# Patient Record
Sex: MALE | Race: BLACK OR AFRICAN AMERICAN | NOT HISPANIC OR LATINO | URBAN - METROPOLITAN AREA
[De-identification: names, ages, dates, MRNs, and addresses within clinical notes are randomized per-mention and may not be internally consistent; named-entity substitution may affect disease eponyms.]

---

## 2021-06-26 ENCOUNTER — EMERGENCY (EMERGENCY)
Facility: HOSPITAL | Age: 71
LOS: 1 days | Discharge: HOME | End: 2021-06-26
Attending: EMERGENCY MEDICINE | Admitting: EMERGENCY MEDICINE
Payer: MEDICARE

## 2021-06-26 VITALS
SYSTOLIC BLOOD PRESSURE: 156 MMHG | TEMPERATURE: 98 F | RESPIRATION RATE: 20 BRPM | HEIGHT: 66 IN | OXYGEN SATURATION: 94 % | DIASTOLIC BLOOD PRESSURE: 86 MMHG | WEIGHT: 186.95 LBS | HEART RATE: 92 BPM

## 2021-06-26 VITALS — DIASTOLIC BLOOD PRESSURE: 81 MMHG | TEMPERATURE: 98 F | SYSTOLIC BLOOD PRESSURE: 131 MMHG

## 2021-06-26 DIAGNOSIS — Z98.89 OTHER SPECIFIED POSTPROCEDURAL STATES: Chronic | ICD-10-CM

## 2021-06-26 LAB
ALBUMIN SERPL ELPH-MCNC: 4.2 G/DL — SIGNIFICANT CHANGE UP (ref 3.3–5)
ALP SERPL-CCNC: 94 U/L — SIGNIFICANT CHANGE UP (ref 40–120)
ALT FLD-CCNC: 55 U/L — SIGNIFICANT CHANGE UP (ref 12–78)
ANION GAP SERPL CALC-SCNC: 5 MMOL/L — SIGNIFICANT CHANGE UP (ref 5–17)
AST SERPL-CCNC: 36 U/L — SIGNIFICANT CHANGE UP (ref 15–37)
BASOPHILS # BLD AUTO: 0.08 K/UL — SIGNIFICANT CHANGE UP (ref 0–0.2)
BASOPHILS NFR BLD AUTO: 1.2 % — SIGNIFICANT CHANGE UP (ref 0–2)
BILIRUB SERPL-MCNC: 0.6 MG/DL — SIGNIFICANT CHANGE UP (ref 0.2–1.2)
BUN SERPL-MCNC: 11 MG/DL — SIGNIFICANT CHANGE UP (ref 7–23)
CALCIUM SERPL-MCNC: 9 MG/DL — SIGNIFICANT CHANGE UP (ref 8.5–10.1)
CHLORIDE SERPL-SCNC: 98 MMOL/L — SIGNIFICANT CHANGE UP (ref 96–108)
CO2 SERPL-SCNC: 32 MMOL/L — HIGH (ref 22–31)
CREAT SERPL-MCNC: 0.87 MG/DL — SIGNIFICANT CHANGE UP (ref 0.5–1.3)
D DIMER BLD IA.RAPID-MCNC: <150 NG/ML DDU — SIGNIFICANT CHANGE UP
EOSINOPHIL # BLD AUTO: 0.64 K/UL — HIGH (ref 0–0.5)
EOSINOPHIL NFR BLD AUTO: 9.9 % — HIGH (ref 0–6)
GLUCOSE SERPL-MCNC: 153 MG/DL — HIGH (ref 70–99)
HCT VFR BLD CALC: 40 % — SIGNIFICANT CHANGE UP (ref 39–50)
HGB BLD-MCNC: 13.1 G/DL — SIGNIFICANT CHANGE UP (ref 13–17)
IMM GRANULOCYTES NFR BLD AUTO: 0.6 % — SIGNIFICANT CHANGE UP (ref 0–1.5)
LYMPHOCYTES # BLD AUTO: 1.91 K/UL — SIGNIFICANT CHANGE UP (ref 1–3.3)
LYMPHOCYTES # BLD AUTO: 29.5 % — SIGNIFICANT CHANGE UP (ref 13–44)
MCHC RBC-ENTMCNC: 26.7 PG — LOW (ref 27–34)
MCHC RBC-ENTMCNC: 32.8 GM/DL — SIGNIFICANT CHANGE UP (ref 32–36)
MCV RBC AUTO: 81.6 FL — SIGNIFICANT CHANGE UP (ref 80–100)
MONOCYTES # BLD AUTO: 0.68 K/UL — SIGNIFICANT CHANGE UP (ref 0–0.9)
MONOCYTES NFR BLD AUTO: 10.5 % — SIGNIFICANT CHANGE UP (ref 2–14)
NEUTROPHILS # BLD AUTO: 3.12 K/UL — SIGNIFICANT CHANGE UP (ref 1.8–7.4)
NEUTROPHILS NFR BLD AUTO: 48.3 % — SIGNIFICANT CHANGE UP (ref 43–77)
NRBC # BLD: 0 /100 WBCS — SIGNIFICANT CHANGE UP (ref 0–0)
NT-PROBNP SERPL-SCNC: 23 PG/ML — SIGNIFICANT CHANGE UP (ref 0–125)
PLATELET # BLD AUTO: 263 K/UL — SIGNIFICANT CHANGE UP (ref 150–400)
POTASSIUM SERPL-MCNC: 3.8 MMOL/L — SIGNIFICANT CHANGE UP (ref 3.5–5.3)
POTASSIUM SERPL-SCNC: 3.8 MMOL/L — SIGNIFICANT CHANGE UP (ref 3.5–5.3)
PROT SERPL-MCNC: 8.1 G/DL — SIGNIFICANT CHANGE UP (ref 6–8.3)
RBC # BLD: 4.9 M/UL — SIGNIFICANT CHANGE UP (ref 4.2–5.8)
RBC # FLD: 15.2 % — HIGH (ref 10.3–14.5)
SODIUM SERPL-SCNC: 135 MMOL/L — SIGNIFICANT CHANGE UP (ref 135–145)
TROPONIN I SERPL-MCNC: <.015 NG/ML — SIGNIFICANT CHANGE UP (ref 0.01–0.04)
WBC # BLD: 6.47 K/UL — SIGNIFICANT CHANGE UP (ref 3.8–10.5)
WBC # FLD AUTO: 6.47 K/UL — SIGNIFICANT CHANGE UP (ref 3.8–10.5)

## 2021-06-26 PROCEDURE — 71045 X-RAY EXAM CHEST 1 VIEW: CPT

## 2021-06-26 PROCEDURE — 94640 AIRWAY INHALATION TREATMENT: CPT

## 2021-06-26 PROCEDURE — 85025 COMPLETE CBC W/AUTO DIFF WBC: CPT

## 2021-06-26 PROCEDURE — 83880 ASSAY OF NATRIURETIC PEPTIDE: CPT

## 2021-06-26 PROCEDURE — 80053 COMPREHEN METABOLIC PANEL: CPT

## 2021-06-26 PROCEDURE — 71045 X-RAY EXAM CHEST 1 VIEW: CPT | Mod: 26

## 2021-06-26 PROCEDURE — 93005 ELECTROCARDIOGRAM TRACING: CPT

## 2021-06-26 PROCEDURE — 36415 COLL VENOUS BLD VENIPUNCTURE: CPT

## 2021-06-26 PROCEDURE — 99284 EMERGENCY DEPT VISIT MOD MDM: CPT | Mod: 25

## 2021-06-26 PROCEDURE — 93010 ELECTROCARDIOGRAM REPORT: CPT

## 2021-06-26 PROCEDURE — 84484 ASSAY OF TROPONIN QUANT: CPT

## 2021-06-26 PROCEDURE — 85379 FIBRIN DEGRADATION QUANT: CPT

## 2021-06-26 PROCEDURE — 99284 EMERGENCY DEPT VISIT MOD MDM: CPT

## 2021-06-26 RX ORDER — IPRATROPIUM BROMIDE 0.2 MG/ML
500 SOLUTION, NON-ORAL INHALATION ONCE
Refills: 0 | Status: COMPLETED | OUTPATIENT
Start: 2021-06-26 | End: 2021-06-26

## 2021-06-26 RX ORDER — ALBUTEROL 90 UG/1
2.5 AEROSOL, METERED ORAL
Refills: 0 | Status: DISCONTINUED | OUTPATIENT
Start: 2021-06-26 | End: 2021-06-29

## 2021-06-26 RX ADMIN — ALBUTEROL 2.5 MILLIGRAM(S): 90 AEROSOL, METERED ORAL at 11:51

## 2021-06-26 RX ADMIN — Medication 125 MILLIGRAM(S): at 11:24

## 2021-06-26 RX ADMIN — ALBUTEROL 2.5 MILLIGRAM(S): 90 AEROSOL, METERED ORAL at 11:30

## 2021-06-26 RX ADMIN — Medication 500 MICROGRAM(S): at 12:30

## 2021-06-26 NOTE — ED ADULT NURSE NOTE - OBJECTIVE STATEMENT
Received pt in bed alert and oriented x4.  C/O sob since today.  Pt reports he is visiting from [place] and they have a dog which exerbated his allergies.  Pt went to  who sent patient for treatment.  Pt denies chest pain or n/v/d. EKG completed.  Pt on monitor.

## 2021-06-26 NOTE — ED PROVIDER NOTE - CONSTITUTIONAL, MLM
Well appearing, black female, awake, alert, oriented to person, place, time/situation and in mild apparent distress. normal...

## 2021-06-26 NOTE — ED ADULT NURSE NOTE - PMH
COPD (chronic obstructive pulmonary disease)    Diabetes mellitus  Type II  HTN (hypertension)    Lumbar disc disease    Nasal polyp    Sleep apnea

## 2021-06-26 NOTE — ED PROVIDER NOTE - NSFOLLOWUPINSTRUCTIONS_ED_ALL_ED_FT
Rest    Increase fluid intake    Use Your Albuterol Inhaler every 4-6 hours until 1-2 days after your shortness of breath and or cough resolves     Take MEDROL DOSE PACK as directed    Follow-up with your doctor this week     Return here if needed

## 2021-06-26 NOTE — ED PROVIDER NOTE - CARE PROVIDER_API CALL
Kris Valenzuela)  Critical Care Medicine; Internal Medicine; Pulmonary Disease  [address] [place] [address]  Phone: [phone]  Fax: [phone]  Follow Up Time:

## 2021-06-26 NOTE — ED ADULT TRIAGE NOTE - CHIEF COMPLAINT QUOTE
Patient is a 71yo Male complaining of shortness of breath and coughing since Monday Patient states that he thinks that his granddaughter dog is triggering his allergies Patient states he has white sputum Patient denies fever chest pain nausea vomiting diarrhea

## 2021-06-26 NOTE — ED PROVIDER NOTE - OBJECTIVE STATEMENT
69 yo black male with H/O COPD (chronic obstructive pulmonary disease)    Diabetes mellitus  Type II  HTN (hypertension)    Lumbar disc disease and    Sleep apnea who was at his baseline state of health up until the past few days when he began to experience mild but increasing SOB, wheezing and mild white productive sputum. Denies any chest pain, fever, chills, hemoptysis, nausea, vomiting or diarrhea. States that the SOB is only minimally worsened with exertion and chest pain can be present even when at rest. Was seen at [facility] today and ultimately sent here for further evaluation and management.

## 2021-06-26 NOTE — ED PROVIDER NOTE - PATIENT PORTAL LINK FT
You can access the FollowMyHealth Patient Portal offered by [facility] by registering at the following website: [email or web address]. By joining [facility]’s FollowMyHealth portal, you will also be able to view your health information using other applications (apps) compatible with our system.

## 2023-07-30 NOTE — ED ADULT NURSE NOTE - CAS DISCH CONDITION
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  Patient reports ambulating short distances to wheelchair at baseline.  Patient / Family Goal: return to previous functional status    Pain   Patient reports pain is not an issue/concern.     OBJECTIVE     Cognitive Status   Level of Consciousness   - alert  Arousal Alertness   - appropriate responses to stimuli  Affect/Behavior    - pleasant and cooperative  Orientation    - Oriented to: person, place, time and situation  Functional Communication   - Overall Status: within functional limits   - Forms of Communication: verbal  Attention Span    - Attention: appears intact   - Attention impairment: distractibility and impulsivity  Following Direction   - follows one step commands with increased time  Safety Awareness/Insight   - intact    Patient Activity Tolerance: 1 to 2 activity to rest         Bed Mobility  - Repositioning in bed: set up  - Supine to sit: total assist - dependent , 2 persons  - Sit to supine: total assist - dependent , 2 persons  CGA to min A x 2 for bed mobility due to impulsivity as patient uses momentum to complete bed mobility and nearly hit his head on the bedrail several times. Assist for setup in trendelenburg but patient able to boost self.   Transfers  Assistive devices: 2-wheeled walker  - Sit to stand: 2 persons, total assist - dependent   - Stand to sit: 2 persons, total assist - dependent   Three person assist due to assist needed force production, blocking of bilateral feet, stabilization of walker, and for safety. Patient with difficulty shifting weight anteriorly. Completed 3 sit<>stands this date (one from edge of bed, two from walker)    Ambulation / Gait  - Assistive device: gait  belt and two-wheeled walker  - Distance (feet unless otherwise indicated): 7, 20  - Assist Level: total assist - dependent  and 2 persons  Patient requesting to ambulate to wheelchair (despite pivot to wheelchair). Patient impulsive with amb, cues for decreased step length and for step to pattern, but patient unable to follow commands consistently. Gait improved with straight distance ambulation on second trial compared to ambulation in room to wheelchair. Three person assist throughout for steadying, close wheelchair follow, and cues for sequencing.      Wheelchair Mobility  - Type: manual  - Propel Method: right upper extremity and bilateral lower extremities  Level Surfaces  - assist: modified independent  - distance: 30  Interventions     Training provided: activity tolerance, safety training, body mechanics, bed mobility training, balance retraining, transfer training, functional ambulation, gait training and use of assistive device    -Education on PT role and goals in acute setting    Skilled input: Verbal instruction/cues, tactile instruction/cues and as detailed above  Verbal Consent: Writer verbally educated and received verbal consent for hand placement, positioning of patient, and techniques to be performed today from patient for clothing adjustments for techniques, hand placement and palpation for techniques and therapist position for techniques as described above and how they are pertinent to the patient's plan of care.         Education:   - Present and ready to learn: patient  Education provided during session:  - Results of above outlined education: Verbalizes understanding, Demonstrates understanding and Needs reinforcement    ASSESSMENT   Progress: progressing toward goals  Interferring components: decreased activity tolerance, weight bearing status and medical status limitations    Summary of function and discharge needs based on today's assessment:   - Current level of function: significantly  below baseline level of function   - Therapy needs at discharge: therapy 5 or more times per week   - Activities of daily living (ADLs) requiring support at discharge: bed mobility, transfers and ambulation   - Impairments that require further therapy intervention: activity tolerance, balance, strength, safety awareness and ROM    AM-PAC  - Generalized Prior Level of Function: Needs a little help ([facility] 12-21)       Key: MOD A=moderate assistance, IND/MOD I=independent/modified independent  - Generalized Current Level of Function     - Current Mobility Score: 6       AM-PAC Scoring Key= >21 Modified Independent; 20-21 Supervision; 18-19 Minimal assist; 13-18 Moderate assist; 9-12 Max assist; <9 Total assist    Visit#: 3        PLAN   Suggestions for next session as indicated: Cotx and bring aide and wheelchair, progress transfers and ambulation.  PT Frequency: 3-5 x per week      PT/OT Mobility Equipment for Discharge: has walker and w/c  PT/OT ADL Equipment for Discharge: Continue to assess based on discharge location.  Agreement to plan and goals: patient agrees with goals and treatment plan        GOALS  Review Date: 8/2/2023  Long Term Goals: (to be met by time of discharge from hospital)  Sit to supine: Patient will complete sit to supine modified independent.  Status: progressing/ongoing  Supine to sit: Patient will complete supine to sit modified independent.  Status: progressing/ongoing  Reposition in bed: Patient will complete repositioning in bed modified independent.  Status: progressing/ongoing  Sit to stand: Patient will complete sit to stand transfer with 2-wheeled walker, modified independent.   Status: progressing/ongoing  Stand to sit: Patient will complete stand to sit transfer with 2-wheeled walker, modified independent.   Status: progressing/ongoing  Stand pivot: Patient will complete stand pivot transfer with 2-wheeled walker, modified independent.   Status: progressing/ongoing  Ambulation  (even): Patient will ambulate on even surface for 20 feet with 2-wheeled walker, modified independent.   Status: progressing/ongoing    Documented in the chart in the following areas: Assessment/Plan.      Patient at End of Session:   Location: in bed  Safety measures: call light within reach and equipment intact      Therapy procedure time and total treatment time can be found documented on the Time Entry flowsheet   Stable
